# Patient Record
Sex: FEMALE | Race: BLACK OR AFRICAN AMERICAN | NOT HISPANIC OR LATINO | URBAN - METROPOLITAN AREA
[De-identification: names, ages, dates, MRNs, and addresses within clinical notes are randomized per-mention and may not be internally consistent; named-entity substitution may affect disease eponyms.]

---

## 2023-10-02 ENCOUNTER — EMERGENCY (EMERGENCY)
Facility: HOSPITAL | Age: 33
LOS: 1 days | Discharge: HOME | End: 2023-10-02
Attending: EMERGENCY MEDICINE
Payer: COMMERCIAL

## 2023-10-02 VITALS
HEART RATE: 76 BPM | RESPIRATION RATE: 18 BRPM | SYSTOLIC BLOOD PRESSURE: 114 MMHG | OXYGEN SATURATION: 99 % | DIASTOLIC BLOOD PRESSURE: 72 MMHG

## 2023-10-02 VITALS
HEIGHT: 65 IN | SYSTOLIC BLOOD PRESSURE: 107 MMHG | HEART RATE: 99 BPM | OXYGEN SATURATION: 98 % | DIASTOLIC BLOOD PRESSURE: 70 MMHG | RESPIRATION RATE: 18 BRPM | WEIGHT: 142.2 LBS | TEMPERATURE: 99 F

## 2023-10-02 LAB
ALBUMIN SERPL ELPH-MCNC: 3.5 G/DL — SIGNIFICANT CHANGE UP (ref 3.3–5.2)
ALP SERPL-CCNC: 52 U/L — SIGNIFICANT CHANGE UP (ref 40–120)
ALT FLD-CCNC: 10 U/L — SIGNIFICANT CHANGE UP
ANION GAP SERPL CALC-SCNC: 10 MMOL/L — SIGNIFICANT CHANGE UP (ref 5–17)
APPEARANCE UR: ABNORMAL
AST SERPL-CCNC: 14 U/L — SIGNIFICANT CHANGE UP
BACTERIA # UR AUTO: ABNORMAL
BASOPHILS # BLD AUTO: 0.01 K/UL — SIGNIFICANT CHANGE UP (ref 0–0.2)
BASOPHILS NFR BLD AUTO: 0.1 % — SIGNIFICANT CHANGE UP (ref 0–2)
BILIRUB SERPL-MCNC: 0.3 MG/DL — LOW (ref 0.4–2)
BILIRUB UR-MCNC: NEGATIVE — SIGNIFICANT CHANGE UP
BLD GP AB SCN SERPL QL: SIGNIFICANT CHANGE UP
BUN SERPL-MCNC: 9.1 MG/DL — SIGNIFICANT CHANGE UP (ref 8–20)
CALCIUM SERPL-MCNC: 9.5 MG/DL — SIGNIFICANT CHANGE UP (ref 8.4–10.5)
CHLORIDE SERPL-SCNC: 98 MMOL/L — SIGNIFICANT CHANGE UP (ref 96–108)
CO2 SERPL-SCNC: 25 MMOL/L — SIGNIFICANT CHANGE UP (ref 22–29)
COLOR SPEC: YELLOW — SIGNIFICANT CHANGE UP
COMMENT - URINE: SIGNIFICANT CHANGE UP
CREAT SERPL-MCNC: 0.45 MG/DL — LOW (ref 0.5–1.3)
DIFF PNL FLD: NEGATIVE — SIGNIFICANT CHANGE UP
EGFR: 130 ML/MIN/1.73M2 — SIGNIFICANT CHANGE UP
EOSINOPHIL # BLD AUTO: 0.05 K/UL — SIGNIFICANT CHANGE UP (ref 0–0.5)
EOSINOPHIL NFR BLD AUTO: 0.6 % — SIGNIFICANT CHANGE UP (ref 0–6)
EPI CELLS # UR: ABNORMAL
GLUCOSE SERPL-MCNC: 69 MG/DL — LOW (ref 70–99)
GLUCOSE UR QL: NEGATIVE MG/DL — SIGNIFICANT CHANGE UP
HCG SERPL-ACNC: HIGH MIU/ML
HCT VFR BLD CALC: 36.4 % — SIGNIFICANT CHANGE UP (ref 34.5–45)
HGB BLD-MCNC: 11.4 G/DL — LOW (ref 11.5–15.5)
IMM GRANULOCYTES NFR BLD AUTO: 0.7 % — SIGNIFICANT CHANGE UP (ref 0–0.9)
KETONES UR-MCNC: NEGATIVE — SIGNIFICANT CHANGE UP
LEUKOCYTE ESTERASE UR-ACNC: NEGATIVE — SIGNIFICANT CHANGE UP
LIDOCAIN IGE QN: 21 U/L — LOW (ref 22–51)
LYMPHOCYTES # BLD AUTO: 1.29 K/UL — SIGNIFICANT CHANGE UP (ref 1–3.3)
LYMPHOCYTES # BLD AUTO: 14.7 % — SIGNIFICANT CHANGE UP (ref 13–44)
MCHC RBC-ENTMCNC: 24.7 PG — LOW (ref 27–34)
MCHC RBC-ENTMCNC: 31.3 GM/DL — LOW (ref 32–36)
MCV RBC AUTO: 79 FL — LOW (ref 80–100)
MONOCYTES # BLD AUTO: 0.57 K/UL — SIGNIFICANT CHANGE UP (ref 0–0.9)
MONOCYTES NFR BLD AUTO: 6.5 % — SIGNIFICANT CHANGE UP (ref 2–14)
NEUTROPHILS # BLD AUTO: 6.77 K/UL — SIGNIFICANT CHANGE UP (ref 1.8–7.4)
NEUTROPHILS NFR BLD AUTO: 77.4 % — HIGH (ref 43–77)
NITRITE UR-MCNC: NEGATIVE — SIGNIFICANT CHANGE UP
PH UR: 7 — SIGNIFICANT CHANGE UP (ref 5–8)
PLATELET # BLD AUTO: 260 K/UL — SIGNIFICANT CHANGE UP (ref 150–400)
POTASSIUM SERPL-MCNC: 3.9 MMOL/L — SIGNIFICANT CHANGE UP (ref 3.5–5.3)
POTASSIUM SERPL-SCNC: 3.9 MMOL/L — SIGNIFICANT CHANGE UP (ref 3.5–5.3)
PROT SERPL-MCNC: 6.5 G/DL — LOW (ref 6.6–8.7)
PROT UR-MCNC: NEGATIVE — SIGNIFICANT CHANGE UP
RBC # BLD: 4.61 M/UL — SIGNIFICANT CHANGE UP (ref 3.8–5.2)
RBC # FLD: 14 % — SIGNIFICANT CHANGE UP (ref 10.3–14.5)
RBC CASTS # UR COMP ASSIST: SIGNIFICANT CHANGE UP /HPF (ref 0–4)
SODIUM SERPL-SCNC: 133 MMOL/L — LOW (ref 135–145)
SP GR SPEC: 1.01 — SIGNIFICANT CHANGE UP (ref 1.01–1.02)
UROBILINOGEN FLD QL: NEGATIVE MG/DL — SIGNIFICANT CHANGE UP
WBC # BLD: 8.75 K/UL — SIGNIFICANT CHANGE UP (ref 3.8–10.5)
WBC # FLD AUTO: 8.75 K/UL — SIGNIFICANT CHANGE UP (ref 3.8–10.5)
WBC UR QL: SIGNIFICANT CHANGE UP /HPF (ref 0–5)

## 2023-10-02 PROCEDURE — 87086 URINE CULTURE/COLONY COUNT: CPT

## 2023-10-02 PROCEDURE — 84702 CHORIONIC GONADOTROPIN TEST: CPT

## 2023-10-02 PROCEDURE — 96374 THER/PROPH/DIAG INJ IV PUSH: CPT

## 2023-10-02 PROCEDURE — 81001 URINALYSIS AUTO W/SCOPE: CPT

## 2023-10-02 PROCEDURE — 76805 OB US >/= 14 WKS SNGL FETUS: CPT | Mod: 26

## 2023-10-02 PROCEDURE — 76705 ECHO EXAM OF ABDOMEN: CPT

## 2023-10-02 PROCEDURE — 80053 COMPREHEN METABOLIC PANEL: CPT

## 2023-10-02 PROCEDURE — 83690 ASSAY OF LIPASE: CPT

## 2023-10-02 PROCEDURE — 99284 EMERGENCY DEPT VISIT MOD MDM: CPT | Mod: 25

## 2023-10-02 PROCEDURE — 86901 BLOOD TYPING SEROLOGIC RH(D): CPT

## 2023-10-02 PROCEDURE — 76705 ECHO EXAM OF ABDOMEN: CPT | Mod: 26

## 2023-10-02 PROCEDURE — 85025 COMPLETE CBC W/AUTO DIFF WBC: CPT

## 2023-10-02 PROCEDURE — 74181 MRI ABDOMEN W/O CONTRAST: CPT | Mod: 26,MA

## 2023-10-02 PROCEDURE — 74181 MRI ABDOMEN W/O CONTRAST: CPT | Mod: MA

## 2023-10-02 PROCEDURE — 99284 EMERGENCY DEPT VISIT MOD MDM: CPT

## 2023-10-02 PROCEDURE — 36415 COLL VENOUS BLD VENIPUNCTURE: CPT

## 2023-10-02 PROCEDURE — 76805 OB US >/= 14 WKS SNGL FETUS: CPT

## 2023-10-02 PROCEDURE — 86900 BLOOD TYPING SEROLOGIC ABO: CPT

## 2023-10-02 PROCEDURE — 86850 RBC ANTIBODY SCREEN: CPT

## 2023-10-02 RX ORDER — ACETAMINOPHEN 500 MG
1000 TABLET ORAL ONCE
Refills: 0 | Status: COMPLETED | OUTPATIENT
Start: 2023-10-02 | End: 2023-10-02

## 2023-10-02 RX ORDER — CEPHALEXIN 500 MG
500 CAPSULE ORAL ONCE
Refills: 0 | Status: COMPLETED | OUTPATIENT
Start: 2023-10-02 | End: 2023-10-02

## 2023-10-02 RX ORDER — SODIUM CHLORIDE 9 MG/ML
1000 INJECTION INTRAMUSCULAR; INTRAVENOUS; SUBCUTANEOUS ONCE
Refills: 0 | Status: DISCONTINUED | OUTPATIENT
Start: 2023-10-02 | End: 2023-10-09

## 2023-10-02 RX ORDER — SODIUM CHLORIDE 9 MG/ML
1000 INJECTION INTRAMUSCULAR; INTRAVENOUS; SUBCUTANEOUS ONCE
Refills: 0 | Status: COMPLETED | OUTPATIENT
Start: 2023-10-02 | End: 2023-10-02

## 2023-10-02 RX ADMIN — SODIUM CHLORIDE 1000 MILLILITER(S): 9 INJECTION INTRAMUSCULAR; INTRAVENOUS; SUBCUTANEOUS at 10:31

## 2023-10-02 RX ADMIN — Medication 400 MILLIGRAM(S): at 10:31

## 2023-10-02 RX ADMIN — Medication 500 MILLIGRAM(S): at 18:07

## 2023-10-02 NOTE — ED STATDOCS - NSFOLLOWUPINSTRUCTIONS_ED_ALL_ED_FT
-Tanpri fè swivi ak doktè prensipal ou nan 2 brandie glory iker yo. Tanpri rele demen betsy yon randevou. Si ou pa kapab swiv doktè ou, tanpri retounen nan ED betsy nenpòt pwoblèm ijan. - Yo te ba w yon kopi tès yo fè delilah a. Tanpri pote rezilta yo avèk ou epi revize yo avèk doktè swen prensipal ou. - Si w gen nenpòt ki sentòm yo anthony pi grav oswa nenpòt lòt enkyetid, tanpri retounen nan ED la imedyatman. - Tanpri kontinye pran medikaman lakay ou madiha yo mande w la.    Doulè nan Vant, Adilt  Abdominal Pain, Adult  Anpil bagay ka lakòz doulè nan nicole (doulè abdominal). Souvan, doulè nan vant abel pa grav e li amelyore angeles okenn tretman oswa ak tretman adomisil. Men, pafwa doulè nan vant abel ka grav.    Pwofesyonèl swen sante w pral poze w kesyon ana istwa medikal ou epi l ap fè yon egzamen fizik betsy idania dekouvri kòz doulè nan vant ou an.    Swiv direktiv sa yo lakay ou:    Medikaman yo    Pran medikaman [place] preskripsyon ak medikaman ana preskripsyon sèlman madiha pwofesyonèl swen sante w di ou.  Piga pran yon laksatif amwens ke pwofesyonèl swen sante w nan di w sa.  Direktiv jeneral    Siveye pwoblèm ou an betsy nenpòt chanjman.  Bwè ase likid betsy kenbe pipi w jòn pal.  Naina nan tout vizit suivi yo madiha pwofesyonèl swen sante w di ou. Sa enpòtan.  Kontakte yon pwofesyonèl swen sante si:  Doulè nan vant ou an chanje oswa li anthony pi mal.  Ou pa grangou oubyen w ap pèdi pwa angeles w pa eseye.  Ou konstipe oswa ou gen dyare pandan plis pase 2–3 brandie.  Ou gen doulè lè w ap pipi oswa lè w ap poupou.  Doulè nan vant ou an reveye w lannwit.  Doulè ou anthony pi grav avèk repa, apre w fin manje oswa avèk sèten manje.  W ap vomi epi ou pa kapab kenbe anyen nan vant ou.  Ou gen yon lafyèv.  Ou gen [place] nan pipi ou.  Chèche èd touswit si:  Doulè w la pa naina nan lespas tan pwofesyonèl swen sante w te di w betsy w prevwa.  Ou pa kapab sispann vomi.  Ou maryana doulè ou sèlman nan kèk zòn vant ou, tankou nan bò dwat la oswa nan marco antonio enferyè goch vant ou. Doulè ana bò dwat la kapab akòz apenndisit.  Ou gen poupou sanglan oswa nwa, oswa poupou ki tankou goudwon.  Ou gen doulè grav, kranp oswa [facility] janay vant ou.  Ou gen siy dezidratasyon, tankou:  Pipi fonse, yon kantite pipi ki piti anpil, oswa ou pa pipi.  Po bouch colleen.  Bouch sèk.  Je giulia.  Anvi dòmi.  Feblès.  Ou gen pwoblèm betsy respire oswa doulè nan pwatrin.  Rezime  Souvan, doulè nan vant abel pa grav e li amelyore [facility] oswa ak trean adomisil. Men, pafwa doulè nan vant abel ka grav.  Siveye pwoblèm ou an betsy nenpòt chanjman.  Pran medikaman angeles preskripsyon ak medikaman ana preskripsyon sèlman madiha pwofesyonèl swen sante w di ou.  Kontakte yon pwofesyonèl swen sante si doulè nan vant ou an chanje oswa li anthony pi mal.  Chèche èd touswit si ou gen doulè grav, kranp oswa balònman nan vant ou.  Enfòmasyon sa yo pa la betsy ranplase konsèy pwofesyonèl swen sante ou ba ou. Sonje betsy pale ana nenpòt kesyon ou genyen avèk pwofesyonèl swen sante ou.

## 2023-10-02 NOTE — ED STATDOCS - PROGRESS NOTE DETAILS
OB US with single live IUP. Appendix on US could not be visualized. Patient was reassessed, continues to complain of RLQ pain and is tender on exam, MRI abdomen/pelvis ordered and MRI tech informed. GYN was also consulted. Asymptomatic bacteruria on UA, Keflex given. - Pinkhasov recvd in sigbn out Dr Riana Rock to me  Pregnant r/o appy awaiting MR MR does not visualize appendix however on PE by me pt is not tender to palpation , has no guarding or rebound and has eaten without any incident or pain   Pt understands to return immediately if pain reoccurs

## 2023-10-02 NOTE — ED ADULT NURSE NOTE - NSFALLUNIVINTERV_ED_ALL_ED
Bed/Stretcher in lowest position, wheels locked, appropriate side rails in place/Call bell, personal items and telephone in reach/Instruct patient to call for assistance before getting out of bed/chair/stretcher/Non-slip footwear applied when patient is off stretcher/[place] to call system/Physically safe environment - no spills, clutter or unnecessary equipment/Purposeful proactive rounding/Room/bathroom lighting operational, light cord in reach

## 2023-10-02 NOTE — ED STATDOCS - GASTROINTESTINAL, MLM
abdomen soft, RLQ tenderness to palpation, suprapubic tenderness, and non-distended. Bowel sounds present.

## 2023-10-02 NOTE — ED ADULT NURSE REASSESSMENT NOTE - NS ED NURSE REASSESS COMMENT FT1
Pt A & Ox4 w/ visitors at bedside. Offers no complaints at this time. Provider at bedside, plan of care is awaiting d/c at this time.

## 2023-10-02 NOTE — ED STATDOCS - CLINICAL SUMMARY MEDICAL DECISION MAKING FREE TEXT BOX
32 y/o female appox 12 weeks p/w RLQ abdominal pain 8/10. No vaginal bleeding. Doesn't not follow with GYN or had ultrasound done yet. DDx includes round ligament pain vs appendicitis vs UTI among other etiologies. Will check labs, appendix sono, GYN sono, start on Tylenol, IV fluids, f/u studies and dispo pending clinical course.

## 2023-10-02 NOTE — ED ADULT NURSE NOTE - OBJECTIVE STATEMENT
c/o HA x 3 days, right sided abd pain and diarrhea x 1 day. Pt 12 weeks pregnant , PMH of 2 c sections. Pt denies dizziness, SOB, N/V, fevers, chills, CP, palpitations. Pt AOx4, speaking coherently, respirations even and unlabored on RA, skin warm and dry.

## 2023-10-02 NOTE — ED ADULT NURSE REASSESSMENT NOTE - NS ED NURSE REASSESS COMMENT FT1
Transport present to bring pt to MRI. Pt stated she has nothing metal on her or her clothing. Pt resting comfortably, respirations even and unlabored on RA, skin warm and dry.

## 2023-10-02 NOTE — ED STATDOCS - OBJECTIVE STATEMENT
32 y/o female  @12 weeks with pmhx of  2x, c/o headache x3 days, right sided abdominal pain and diarrhea x1 day. Denies taking any medication. Denies vaginal bleeding. No fever. Denies prior GYN evaluations or ultrasound for pregnancy. No nausea or vomiting. Pt reports that in 1st pregnancy was vomiting blood with other complications which lead to  and  2nd she opted for .    # [id number]

## 2023-10-02 NOTE — ED STATDOCS - CARE PROVIDER_API CALL
Ramone Padron  Obstetrics and Gynecology  [address], [address]  [place] [address]  Phone: [phone]  Fax: [phone]  Follow Up Time:

## 2023-10-02 NOTE — ED STATDOCS - PATIENT PORTAL LINK FT
You can access the FollowMyHealth Patient Portal offered by [facility] by registering at the following website: [email or web address]. By joining [employer]’s FollowMyHealth portal, you will also be able to view your health information using other applications (apps) compatible with our system.

## 2023-10-02 NOTE — ED ADULT TRIAGE NOTE - CHIEF COMPLAINT QUOTE
pt states she is 3 months pregnant, c/o right lower abd pain, diarrhea yesterday  A&Ox3, resp wnl, NAD

## 2023-10-04 LAB
CULTURE RESULTS: SIGNIFICANT CHANGE UP
SPECIMEN SOURCE: SIGNIFICANT CHANGE UP

## 2023-11-08 ENCOUNTER — APPOINTMENT (OUTPATIENT)
Dept: ANTEPARTUM | Facility: CLINIC | Age: 33
End: 2023-11-08
Payer: MEDICAID

## 2023-11-08 ENCOUNTER — ASOB RESULT (OUTPATIENT)
Age: 33
End: 2023-11-08

## 2023-11-08 PROCEDURE — 76811 OB US DETAILED SNGL FETUS: CPT

## 2023-11-08 PROCEDURE — 76817 TRANSVAGINAL US OBSTETRIC: CPT | Mod: 59

## 2023-12-12 PROBLEM — Z00.00 ENCOUNTER FOR PREVENTIVE HEALTH EXAMINATION: Status: ACTIVE | Noted: 2023-12-12

## 2023-12-13 ENCOUNTER — APPOINTMENT (OUTPATIENT)
Dept: MATERNAL FETAL MEDICINE | Facility: CLINIC | Age: 33
End: 2023-12-13
Payer: MEDICAID

## 2023-12-13 ENCOUNTER — ASOB RESULT (OUTPATIENT)
Age: 33
End: 2023-12-13

## 2023-12-13 PROCEDURE — 99442: CPT

## 2024-02-28 ENCOUNTER — APPOINTMENT (OUTPATIENT)
Dept: ANTEPARTUM | Facility: CLINIC | Age: 34
End: 2024-02-28
Payer: COMMERCIAL

## 2024-02-28 ENCOUNTER — ASOB RESULT (OUTPATIENT)
Age: 34
End: 2024-02-28

## 2024-02-28 PROCEDURE — 76819 FETAL BIOPHYS PROFIL W/O NST: CPT

## 2024-02-28 PROCEDURE — 76816 OB US FOLLOW-UP PER FETUS: CPT

## 2024-03-15 ENCOUNTER — OUTPATIENT (OUTPATIENT)
Dept: OUTPATIENT SERVICES | Facility: HOSPITAL | Age: 34
LOS: 1 days | End: 2024-03-15
Payer: COMMERCIAL

## 2024-03-15 DIAGNOSIS — Z01.812 ENCOUNTER FOR PREPROCEDURAL LABORATORY EXAMINATION: ICD-10-CM

## 2024-03-15 PROCEDURE — 86900 BLOOD TYPING SEROLOGIC ABO: CPT

## 2024-03-15 PROCEDURE — 36415 COLL VENOUS BLD VENIPUNCTURE: CPT

## 2024-03-15 PROCEDURE — 86901 BLOOD TYPING SEROLOGIC RH(D): CPT

## 2024-03-15 PROCEDURE — 86850 RBC ANTIBODY SCREEN: CPT

## 2024-03-17 ENCOUNTER — TRANSCRIPTION ENCOUNTER (OUTPATIENT)
Age: 34
End: 2024-03-17

## 2024-03-17 RX ORDER — OXYTOCIN 10 UNIT/ML
333.33 VIAL (ML) INJECTION
Qty: 20 | Refills: 0 | Status: DISCONTINUED | OUTPATIENT
Start: 2024-03-18 | End: 2024-03-20

## 2024-03-17 RX ORDER — CHLORHEXIDINE GLUCONATE 213 G/1000ML
1 SOLUTION TOPICAL DAILY
Refills: 0 | Status: DISCONTINUED | OUTPATIENT
Start: 2024-03-18 | End: 2024-03-18

## 2024-03-17 RX ORDER — SODIUM CHLORIDE 9 MG/ML
1000 INJECTION, SOLUTION INTRAVENOUS
Refills: 0 | Status: DISCONTINUED | OUTPATIENT
Start: 2024-03-18 | End: 2024-03-18

## 2024-03-18 ENCOUNTER — INPATIENT (INPATIENT)
Facility: HOSPITAL | Age: 34
LOS: 1 days | Discharge: HOME | End: 2024-03-20
Attending: OBSTETRICS & GYNECOLOGY | Admitting: OBSTETRICS & GYNECOLOGY
Payer: COMMERCIAL

## 2024-03-18 ENCOUNTER — TRANSCRIPTION ENCOUNTER (OUTPATIENT)
Age: 34
End: 2024-03-18

## 2024-03-18 VITALS
TEMPERATURE: 98 F | HEIGHT: 63 IN | HEART RATE: 101 BPM | SYSTOLIC BLOOD PRESSURE: 112 MMHG | RESPIRATION RATE: 18 BRPM | DIASTOLIC BLOOD PRESSURE: 67 MMHG | WEIGHT: 164.02 LBS

## 2024-03-18 DIAGNOSIS — O34.211 MATERNAL CARE FOR LOW TRANSVERSE SCAR FROM PREVIOUS CESAREAN DELIVERY: ICD-10-CM

## 2024-03-18 DIAGNOSIS — Z98.891 HISTORY OF UTERINE SCAR FROM PREVIOUS SURGERY: Chronic | ICD-10-CM

## 2024-03-18 LAB
BASOPHILS # BLD AUTO: 0.02 K/UL — SIGNIFICANT CHANGE UP (ref 0–0.2)
BASOPHILS NFR BLD AUTO: 0.2 % — SIGNIFICANT CHANGE UP (ref 0–2)
BLD GP AB SCN SERPL QL: SIGNIFICANT CHANGE UP
COMMENT - BLOOD BANK: SIGNIFICANT CHANGE UP
EOSINOPHIL # BLD AUTO: 0.04 K/UL — SIGNIFICANT CHANGE UP (ref 0–0.5)
EOSINOPHIL NFR BLD AUTO: 0.5 % — SIGNIFICANT CHANGE UP (ref 0–6)
HCT VFR BLD CALC: 35.5 % — SIGNIFICANT CHANGE UP (ref 34.5–45)
HCT VFR BLD CALC: 38.3 % — SIGNIFICANT CHANGE UP (ref 34.5–45)
HGB BLD-MCNC: 11.9 G/DL — SIGNIFICANT CHANGE UP (ref 11.5–15.5)
HGB BLD-MCNC: 12.7 G/DL — SIGNIFICANT CHANGE UP (ref 11.5–15.5)
IMM GRANULOCYTES NFR BLD AUTO: 1.1 % — HIGH (ref 0–0.9)
LYMPHOCYTES # BLD AUTO: 1.55 K/UL — SIGNIFICANT CHANGE UP (ref 1–3.3)
LYMPHOCYTES # BLD AUTO: 18.8 % — SIGNIFICANT CHANGE UP (ref 13–44)
MCHC RBC-ENTMCNC: 26.8 PG — LOW (ref 27–34)
MCHC RBC-ENTMCNC: 27.3 PG — SIGNIFICANT CHANGE UP (ref 27–34)
MCHC RBC-ENTMCNC: 33.2 GM/DL — SIGNIFICANT CHANGE UP (ref 32–36)
MCHC RBC-ENTMCNC: 33.5 GM/DL — SIGNIFICANT CHANGE UP (ref 32–36)
MCV RBC AUTO: 81 FL — SIGNIFICANT CHANGE UP (ref 80–100)
MCV RBC AUTO: 81.4 FL — SIGNIFICANT CHANGE UP (ref 80–100)
MONOCYTES # BLD AUTO: 0.84 K/UL — SIGNIFICANT CHANGE UP (ref 0–0.9)
MONOCYTES NFR BLD AUTO: 10.2 % — SIGNIFICANT CHANGE UP (ref 2–14)
NEUTROPHILS # BLD AUTO: 5.7 K/UL — SIGNIFICANT CHANGE UP (ref 1.8–7.4)
NEUTROPHILS NFR BLD AUTO: 69.2 % — SIGNIFICANT CHANGE UP (ref 43–77)
PLATELET # BLD AUTO: 187 K/UL — SIGNIFICANT CHANGE UP (ref 150–400)
PLATELET # BLD AUTO: 212 K/UL — SIGNIFICANT CHANGE UP (ref 150–400)
RBC # BLD: 4.36 M/UL — SIGNIFICANT CHANGE UP (ref 3.8–5.2)
RBC # BLD: 4.73 M/UL — SIGNIFICANT CHANGE UP (ref 3.8–5.2)
RBC # FLD: 14.3 % — SIGNIFICANT CHANGE UP (ref 10.3–14.5)
RBC # FLD: 14.4 % — SIGNIFICANT CHANGE UP (ref 10.3–14.5)
WBC # BLD: 12.49 K/UL — HIGH (ref 3.8–10.5)
WBC # BLD: 8.24 K/UL — SIGNIFICANT CHANGE UP (ref 3.8–10.5)
WBC # FLD AUTO: 12.49 K/UL — HIGH (ref 3.8–10.5)
WBC # FLD AUTO: 8.24 K/UL — SIGNIFICANT CHANGE UP (ref 3.8–10.5)

## 2024-03-18 PROCEDURE — 88302 TISSUE EXAM BY PATHOLOGIST: CPT | Mod: 26

## 2024-03-18 PROCEDURE — 59514 CESAREAN DELIVERY ONLY: CPT | Mod: U9,GC

## 2024-03-18 PROCEDURE — 58700 REMOVAL OF FALLOPIAN TUBE: CPT | Mod: GC

## 2024-03-18 DEVICE — SURGICEL 2 X 14": Type: IMPLANTABLE DEVICE | Status: FUNCTIONAL

## 2024-03-18 DEVICE — SURGICEL SNOW 4 X 4": Type: IMPLANTABLE DEVICE | Status: FUNCTIONAL

## 2024-03-18 RX ORDER — SODIUM CHLORIDE 9 MG/ML
1000 INJECTION, SOLUTION INTRAVENOUS ONCE
Refills: 0 | Status: COMPLETED | OUTPATIENT
Start: 2024-03-18 | End: 2024-03-18

## 2024-03-18 RX ORDER — KETOROLAC TROMETHAMINE 30 MG/ML
30 SYRINGE (ML) INJECTION EVERY 6 HOURS
Refills: 0 | Status: DISCONTINUED | OUTPATIENT
Start: 2024-03-18 | End: 2024-03-19

## 2024-03-18 RX ORDER — LANOLIN
1 OINTMENT (GRAM) TOPICAL EVERY 6 HOURS
Refills: 0 | Status: DISCONTINUED | OUTPATIENT
Start: 2024-03-18 | End: 2024-03-20

## 2024-03-18 RX ORDER — SIMETHICONE 80 MG/1
80 TABLET, CHEWABLE ORAL EVERY 4 HOURS
Refills: 0 | Status: DISCONTINUED | OUTPATIENT
Start: 2024-03-18 | End: 2024-03-20

## 2024-03-18 RX ORDER — OXYCODONE HYDROCHLORIDE 5 MG/1
5 TABLET ORAL
Refills: 0 | Status: DISCONTINUED | OUTPATIENT
Start: 2024-03-18 | End: 2024-03-20

## 2024-03-18 RX ORDER — SODIUM CHLORIDE 9 MG/ML
1000 INJECTION, SOLUTION INTRAVENOUS
Refills: 0 | Status: DISCONTINUED | OUTPATIENT
Start: 2024-03-18 | End: 2024-03-20

## 2024-03-18 RX ORDER — TRANEXAMIC ACID 100 MG/ML
1000 INJECTION, SOLUTION INTRAVENOUS ONCE
Refills: 0 | Status: COMPLETED | OUTPATIENT
Start: 2024-03-18 | End: 2024-03-18

## 2024-03-18 RX ORDER — OXYCODONE HYDROCHLORIDE 5 MG/1
5 TABLET ORAL ONCE
Refills: 0 | Status: DISCONTINUED | OUTPATIENT
Start: 2024-03-18 | End: 2024-03-20

## 2024-03-18 RX ORDER — IBUPROFEN 200 MG
600 TABLET ORAL EVERY 6 HOURS
Refills: 0 | Status: COMPLETED | OUTPATIENT
Start: 2024-03-18 | End: 2025-02-14

## 2024-03-18 RX ORDER — ONDANSETRON 8 MG/1
4 TABLET, FILM COATED ORAL ONCE
Refills: 0 | Status: COMPLETED | OUTPATIENT
Start: 2024-03-18 | End: 2024-03-18

## 2024-03-18 RX ORDER — DIPHENHYDRAMINE HCL 50 MG
25 CAPSULE ORAL EVERY 6 HOURS
Refills: 0 | Status: DISCONTINUED | OUTPATIENT
Start: 2024-03-18 | End: 2024-03-20

## 2024-03-18 RX ORDER — FAMOTIDINE 10 MG/ML
20 INJECTION INTRAVENOUS ONCE
Refills: 0 | Status: COMPLETED | OUTPATIENT
Start: 2024-03-18 | End: 2024-03-18

## 2024-03-18 RX ORDER — ACETAMINOPHEN 500 MG
975 TABLET ORAL
Refills: 0 | Status: DISCONTINUED | OUTPATIENT
Start: 2024-03-18 | End: 2024-03-20

## 2024-03-18 RX ORDER — ACETAMINOPHEN 500 MG
975 TABLET ORAL ONCE
Refills: 0 | Status: COMPLETED | OUTPATIENT
Start: 2024-03-18 | End: 2024-03-18

## 2024-03-18 RX ORDER — SCOPALAMINE 1 MG/3D
1 PATCH, EXTENDED RELEASE TRANSDERMAL ONCE
Refills: 0 | Status: COMPLETED | OUTPATIENT
Start: 2024-03-18 | End: 2024-03-18

## 2024-03-18 RX ORDER — CITRIC ACID/SODIUM CITRATE 300-500 MG
30 SOLUTION, ORAL ORAL ONCE
Refills: 0 | Status: COMPLETED | OUTPATIENT
Start: 2024-03-18 | End: 2024-03-18

## 2024-03-18 RX ORDER — MAGNESIUM HYDROXIDE 400 MG/1
30 TABLET, CHEWABLE ORAL
Refills: 0 | Status: DISCONTINUED | OUTPATIENT
Start: 2024-03-18 | End: 2024-03-20

## 2024-03-18 RX ORDER — OXYTOCIN 10 UNIT/ML
333.33 VIAL (ML) INJECTION
Qty: 20 | Refills: 0 | Status: DISCONTINUED | OUTPATIENT
Start: 2024-03-18 | End: 2024-03-20

## 2024-03-18 RX ORDER — TETANUS TOXOID, REDUCED DIPHTHERIA TOXOID AND ACELLULAR PERTUSSIS VACCINE, ADSORBED 5; 2.5; 8; 8; 2.5 [IU]/.5ML; [IU]/.5ML; UG/.5ML; UG/.5ML; UG/.5ML
0.5 SUSPENSION INTRAMUSCULAR ONCE
Refills: 0 | Status: DISCONTINUED | OUTPATIENT
Start: 2024-03-18 | End: 2024-03-20

## 2024-03-18 RX ORDER — ENOXAPARIN SODIUM 100 MG/ML
40 INJECTION SUBCUTANEOUS EVERY 24 HOURS
Refills: 0 | Status: DISCONTINUED | OUTPATIENT
Start: 2024-03-19 | End: 2024-03-20

## 2024-03-18 RX ORDER — CEFAZOLIN SODIUM 1 G
2000 VIAL (EA) INJECTION ONCE
Refills: 0 | Status: DISCONTINUED | OUTPATIENT
Start: 2024-03-18 | End: 2024-03-18

## 2024-03-18 RX ORDER — CEFAZOLIN SODIUM 1 G
2000 VIAL (EA) INJECTION ONCE
Refills: 0 | Status: COMPLETED | OUTPATIENT
Start: 2024-03-18 | End: 2024-03-18

## 2024-03-18 RX ADMIN — SCOPALAMINE 1 PATCH: 1 PATCH, EXTENDED RELEASE TRANSDERMAL at 12:23

## 2024-03-18 RX ADMIN — ONDANSETRON 4 MILLIGRAM(S): 8 TABLET, FILM COATED ORAL at 17:35

## 2024-03-18 RX ADMIN — Medication 30 MILLILITER(S): at 12:21

## 2024-03-18 RX ADMIN — SODIUM CHLORIDE 125 MILLILITER(S): 9 INJECTION, SOLUTION INTRAVENOUS at 17:36

## 2024-03-18 RX ADMIN — Medication 975 MILLIGRAM(S): at 12:51

## 2024-03-18 RX ADMIN — TRANEXAMIC ACID 220 MILLIGRAM(S): 100 INJECTION, SOLUTION INTRAVENOUS at 12:55

## 2024-03-18 RX ADMIN — Medication 975 MILLIGRAM(S): at 12:21

## 2024-03-18 RX ADMIN — SODIUM CHLORIDE 2000 MILLILITER(S): 9 INJECTION, SOLUTION INTRAVENOUS at 12:00

## 2024-03-18 RX ADMIN — Medication 975 MILLIGRAM(S): at 21:03

## 2024-03-18 RX ADMIN — Medication 1000 MILLIUNIT(S)/MIN: at 13:47

## 2024-03-18 RX ADMIN — Medication 30 MILLIGRAM(S): at 17:35

## 2024-03-18 RX ADMIN — SODIUM CHLORIDE 125 MILLILITER(S): 9 INJECTION, SOLUTION INTRAVENOUS at 12:24

## 2024-03-18 RX ADMIN — Medication 2000 MILLIGRAM(S): at 12:54

## 2024-03-18 RX ADMIN — FAMOTIDINE 20 MILLIGRAM(S): 10 INJECTION INTRAVENOUS at 12:21

## 2024-03-18 RX ADMIN — CHLORHEXIDINE GLUCONATE 1 APPLICATION(S): 213 SOLUTION TOPICAL at 12:43

## 2024-03-18 NOTE — OB PROVIDER H&P - HISTORY OF PRESENT ILLNESS
Patient is a 33 year old  at 39 wks GA by first trimester sono who presents to L&D for rCS + BS.     JO-ANN:  3/25/24   LMP: 23     Pregnancy course:   Fetal EIF on sono, Reshma LR   Prior CSx2     Obhx: : 18 FT pCS for malpresentation, Female, [place]   G2: 10/11/21 FT rCS, Male, [place]   Gynhx: denies fibroids, cysts, STIs, abnl paps   Pmhx: denies   Pshx: CSx2   Meds: PNV   Allergies: NKDA     Social Hx: denies tobacco, recreational drug, alcohol use during pregnancy. Feels safe at home   Ultrasound: vtx, anterior   EFW: 2581g, 21%ile ()   PPBC: BS   BMI: 25.8

## 2024-03-18 NOTE — DISCHARGE NOTE OB - NS MD DC FALL RISK RISK
For information on Fall & Injury Prevention, visit: [email or web address] OR  [email or web address] OR  https://www.cdc.gov/steadi/patient.html

## 2024-03-18 NOTE — DISCHARGE NOTE OB - CARE PROVIDER_API CALL
Eliana Beal  Obstetrics and Gynecology  [facility] [place] [address]  Phone: [phone]  Fax: [phone]  Follow Up Time: 2 weeks

## 2024-03-18 NOTE — OB PROVIDER DELIVERY SUMMARY - NSLOWPPHRISK_OBGYN_A_OB
Ramirez Pregnancy/Less than or equal to 4 previous vaginal births/No known bleeding disorder/No history of postpartum hemorrhage/No other PPH risks indicated

## 2024-03-18 NOTE — OB PROVIDER H&P - ASSESSMENT
Patient is a 33 y.o.  at 39w GA admitted to L&D for scheduled repeat  section + BTL. Cat I FHT.   - consents  - admission labs  - Diet: NPO  - IV hydration  - continuous toco and fetal heart monitoring until surgery  - preop antibiotics 2g ancef  - GBS neg  -PPBC: BTL    Discussed with Dr. Weems

## 2024-03-18 NOTE — DISCHARGE NOTE OB - HOSPITAL COURSE
Patient underwent a repeat  and tubal. Post-partum course was uncomplicated. Pain is well controlled with PRN medication. She has no difficulty with ambulation, voiding, or PO intake. Lab values and vital signs are within normal limits prior to discharge.

## 2024-03-18 NOTE — OB RN DELIVERY SUMMARY - NSMECDELIVBABYA_OBGYN_ALL_OB
PT BIB MOM for possible left ear infection started this morning. mom stated pt had some clear discharge this morning, pt tugging on his ear, being fussy. eating ok making enough wet diapers. Tylenol 10:00AM.
no

## 2024-03-18 NOTE — OB RN INTRAOPERATIVE NOTE - NSSELHIDDEN_OBGYN_ALL_OB_FT
[NS_DeliveryAssist1_OBGYN_ALL_OB_FT:[id number]],[NS_DeliveryRN_OBGYN_ALL_OB_FT:[id number]==],[NS_DeliveryAttending2_OBGYN_ALL_OB_FT:[id number]==]

## 2024-03-18 NOTE — OB RN DELIVERY SUMMARY - NS_SEPSISRSKCALC_OBGYN_ALL_OB_FT
EOS calculated successfully. EOS Risk Factor: 0.5/1000 live births ([facility] national incidence); GA=39w;Temp=98.1; ROM=0.017; GBS='Negative'; Antibiotics='No antibiotics or any antibiotics < 2 hrs prior to birth'

## 2024-03-18 NOTE — OB RN DELIVERY SUMMARY - NSSELHIDDEN_OBGYN_ALL_OB_FT
[NS_DeliveryAssist1_OBGYN_ALL_OB_FT:[id number]],[NS_DeliveryRN_OBGYN_ALL_OB_FT:[id number]],[NS_DeliveryAttending2_OBGYN_ALL_OB_FT:[id number]] [NS_DeliveryAssist1_OBGYN_ALL_OB_FT:[id number]],[NS_DeliveryRN_OBGYN_ALL_OB_FT:[id number]],[NS_DeliveryAttending2_OBGYN_ALL_OB_FT:[id number]],[NS_DeliveryAttending1_OBGYN_ALL_OB_FT:[id number]==]

## 2024-03-18 NOTE — OB RN PATIENT PROFILE - COMFORT/ACCEPTABLE PAIN LEVEL (0-10)
Pt returned call. Gave results, scheduled pt for 6/3 @ 11:00 am phone visit. Pt does NOT use MyChart, so will have to be phone call only, to discuss options about fracture risk treatments with Dr. Mago Sanderson. Pt aware that she will get another phone call when we receive her blood lab results from [facility].      KS and MA's CARLOSI 3

## 2024-03-18 NOTE — OB RN PATIENT PROFILE - TOBACCO USE
"Assessment & Plan:       ICD-10-CM    1. Attention deficit hyperactivity disorder (ADHD), combined type  F90.2    2. Neck pain, chronic  M54.2     G89.29    3. Migraine without status migrainosus, not intractable, unspecified migraine type  G43.909    4. Anxiety  F41.1         We reviewed the treatment options for her chronic neck pain, migraine headache, and ADHD symptoms and we will continue the hydrocodone and adderall as she has been. We discussed the potential for abuse or harm from misuse for both medications, and we had her complete a new treatment agreement today. We reviewed activity as tolerated and use of heat and/or ice tid-qid for comfort for her neck pain. She will call or return to clinic with any ongoing or worsening symptoms. She will continue wellbutrin  mg two times a day for smoking cessation and anxiety. She will call with any significant side effects and will follow up as needed. She will otherwise will f/u in 2-3 mos for a routine med check.             Subjective:            Bindu Hollins is a 39 year old female who presents for follow up of inattention and poor concentration. She has a several year history of inattention with additional symptoms that include need for frequent task redirection, fidgets with hands or feet or squirms in seat, displays difficulty remaining seated and acts as if \"driven by a motor\". She also reports: has difficulty organizing tasks and activities, is easily distracted by extraneous stimuli, is often forgetful in daily activities and avoids engaging in tasks that require sustained attention. She is reported to have a pattern of academic underachievement and school difficulties. She denies has difficulty awaiting turn and interrupts or intrudes on others and talks excessively.       She reports inattention, hyperactivity, which have been worse following a concussion in July. Her memory is slowly improving following that. She continues to have difficulty " getting words out. She has more issues in the afternoon.        She reports that her mood is ok. She denies significant anxiety recently. She restarted wellbutrin for smoking cessation.        Current treatment: adderall 20 mg bid. She complains of the following side effects from the treatment: decreased appetite.         She also has chronic neck pain and migraine headaches. Event that precipitated these symptoms: none known. Onset of symptoms was several years ago, and have been gradually worsening since that time. She had a concussion following an injury in July at school. She hit the ground and bounced after hitting her partner's head. She has been slowly improving. Patient denies numbness or weakness in arms. Patient has had recurrent self limited episodes of neck pain in the past and previous osteoarthritis of cervical spine. Previous treatments: physical therapy, chiropractor/manipulation and medication: hydrocodone.        She has been taking hydrocodone 10/325 mg 3-4x daily, occasionally more recently due to her injury and concussion. Has tried several migraine prevention medications including topamax, gabapentin and multiple triptan medications in the past without relief.         She has also had some jaw pain recently and her chiropractor recommended a TMJ specialist. She also had her veins evaluated and is considering treatment.         She is interested in wellbutrin for smoking cessation. Has done well with it previously.        She has a new job at [employer] as a     The following portions of the patient's history were reviewed and updated as appropriate: allergies, current medications, past family history, past medical history, past social history, past surgical history and problem list.    Review of Systems  12 point ROS negative except as noted above      Objective:     Vitals:    09/23/21 1710   BP: 122/80   BP Location: Right arm   Patient Position: Sitting   Cuff  "Size: Adult Regular   Pulse: 77   SpO2: 99%   Weight: 84.8 kg (186 lb 14.4 oz)   Height: 1.664 m (5' 5.5\")     GEN: Alert and oriented, NAD, well nourished  SKIN:  Normal skin turgor, no lesions/rashes   HEENT: NC/AT, moist mucous membranes, no rhinorrhea.    NECK: Normal.  No adenopathy or thyromegaly.  CV: Regular rate and rhythm, no murmurs.   LUNGS: Clear to auscultation bilaterally.    ABDOMEN: Soft, non-tender, non-distended, no masses   BACK: Normal  EXTREMITY: No edema, cyanosis  NEURO: Grossly normal.               " Never smoker

## 2024-03-18 NOTE — DISCHARGE NOTE OB - PATIENT PORTAL LINK FT
You can access the FollowMyHealth Patient Portal offered by [facility] by registering at the following website: [email or web address]. By joining [employer]’s FollowMyHealth portal, you will also be able to view your health information using other applications (apps) compatible with our system.

## 2024-03-18 NOTE — OB PROVIDER H&P - NSHPPHYSICALEXAM_GEN_ALL_CORE
Vital Signs  T(C): 36.7 (18 Mar 2024 11:54), Max: 36.7 (18 Mar 2024 10:58)  T(F): 98.06 (18 Mar 2024 11:54), Max: 98.1 (18 Mar 2024 10:58)  HR: 101 (18 Mar 2024 11:50) (101 - 101)  BP: 112/67 (18 Mar 2024 11:50) (112/67 - 112/67)  RR: 18 (18 Mar 2024 11:54) (18 - 18)    GEN: AAOx3  ABD: gravid, soft, nontender  EXTREM: nontender, no erythema, no pitting edema    FHT: 150bpm, +accels, no decels mod variability   [place]: irreg contractions q4-10 mins

## 2024-03-18 NOTE — OB PROVIDER H&P - NSLOWPPHRISK_OBGYN_A_OB
[facility] Quality Flow/Interdisciplinary Rounds Progress Note        Quality Flow Rounds held on April 1, 2020    Disciplines Attending:  Bedside Nurse, ,  and Nursing Unit Leadership    Nakia Jim was admitted on 3/19/2020  6:49 PM    Anticipated Discharge Date:  Expected Discharge Date: 04/06/20    Disposition:    Alek Score:  Alek Scale Score: 19    Readmission Risk              Risk of Unplanned Readmission:        9           Discussed patient goal for the day, patient clinical progression, and barriers to discharge. The following Goal(s) of the Day/Commitment(s) have been identified:  await COVID testing, wean oxygen.       Brian Ramon  April 1, 2020 [facility] Pregnancy/Less than or equal to 4 previous vaginal births/No known bleeding disorder/No history of postpartum hemorrhage/No other PPH risks indicated

## 2024-03-18 NOTE — OB PROVIDER H&P - ATTENDING COMMENTS
34 yo  at 39w GA admitted to L&D for scheduled rCS + BTL:  -upon arrival patinet feeling well, no compliants at this time   -Cat I FHT, vitals labs wnl.   -consents for rCS and BTL signed with  and Dr. Bell   -NPO until procedure  -patient has no further questions at this time 34 yo  at 39w GA admitted to L&D for scheduled rCS + BTL:  -upon arrival patinet feeling well, no compliants at this time   -Cat I FHT, vitals labs wnl.   -consents for rCS and BTL signed with  and Dr. Bell   -YOKOO until procedure  -patient has no further questions at this time      Attg Addendum:  I saw and evaluated patient with Dr Weems.    She is a 33 year old  at 39 wks GA by first trimester sono who presents to L&D for rCS + BS.   Will admit to L+D and preop for repeat c/s and BS for permanent sterilization  Informed consent obtained for both RC/S and BS.  All questions answered for pt.  She will accept blood products if needed.    SUBHASH Ott

## 2024-03-18 NOTE — OB PROVIDER DELIVERY SUMMARY - NSSELHIDDEN_OBGYN_ALL_OB_FT
[NS_DeliveryAssist1_OBGYN_ALL_OB_FT:[id number]],[NS_DeliveryRN_OBGYN_ALL_OB_FT:[id number]],[NS_DeliveryAttending2_OBGYN_ALL_OB_FT:[id number]],[NS_DeliveryAttending1_OBGYN_ALL_OB_FT:[id number]],[NS_DeliveryAssist2_OBGYN_ALL_OB_FT:[id number]]

## 2024-03-18 NOTE — DISCHARGE NOTE OB - CARE PLAN
Principal Discharge DX:	 delivery delivered  Assessment and plan of treatment:	Patient had  section. Hospital course was uncomplicated. Patient should plan to make an appointment with the office within 1 week for an incision check and 6 weeks for postpartum follow-up. Postpartum precautions were given at the time of discharge.   1

## 2024-03-18 NOTE — OB RN INTRAOPERATIVE NOTE - NS_ADDITIONALPERSONNEL_OBGYN_ALL_OB_FT
Last Mo, Paramedic Student; ELISABETH Rivera Dr. KARRIE Weems; Last Mo, Paramedic Student; ELISABETH Rivera

## 2024-03-18 NOTE — DISCHARGE NOTE OB - PHYSICIAN SECTION COMPLETE
Patients medication was sent to [place], they only live there in the winter.  It will take a few days before they will be sent to them here in [place].  Patient is wondering if you would have some samples for patient for a few days.  Vimpat 100mg, Breviate 50mg  is the medications  she is taking about.    [facility] in [place]   Yes

## 2024-03-18 NOTE — OB PROVIDER DELIVERY SUMMARY - NSPROVIDERDELIVERYNOTE_OBGYN_ALL_OB_FT
34 yo who presented for scheduled repeat C/S + BTL in the setting of prior c/s x2.   Patient was taken to the OR, a repeat low transverse  section + BTL was performed and a viable male infant was delivered atraumatically in vertex presentation at 1315. His name is Matt!  Loose nuchal cord x2, which was reduced. Delayed cord clamping was performed, then the cord was clamped and cut and the infant was handed off to the awaiting pediatrics team. Placenta delivered at 1316. Hysterotomy was reapproximated with suture, excellent hemostasis was noted. Bilateral salpingectomy was performed using the LigaSure. While reapproximating the fascia, bleeding was noted coming from below the fascial layer. The bleeding source was investigated, and was found to be coming from the underside of the right lateral rectus muscle and superior to the bladder flap layer. Electrocautery, Surgicell, and Surgisnow were utilized to help aid in hemostasis. Hemostasis was noted, and closure continued. Fascia and skin were reapproximated with suture, and excellent hemostasis was achieved at each layer of closure. Patient received TXA x2 intraoperatively for bleeding. The patient tolerated the procedure well and was taken back to recovery in stable condition.   Apgars: 9&9  QBL: 951  UOP: 100cc  IVF: 1000mL 34 yo , now , at 39w GA presented for scheduled repeat C/S + BTL in the setting of prior c/s x2.   Patient was taken to the OR, a repeat low transverse  section + BTL was performed and a viable male infant was delivered atraumatically in vertex presentation at 1315. His name is Matt!  Loose nuchal cord x2, which was reduced. Delayed cord clamping was performed, then the cord was clamped and cut and the infant was handed off to the awaiting pediatrics team. Placenta delivered at 1316. Hysterotomy was reapproximated with suture, excellent hemostasis was noted. Bilateral salpingectomy was performed using the LigaSure. While reapproximating the fascia, bleeding was noted coming from below the fascial layer. The bleeding source was investigated, and was found to be coming from the underside of the right lateral rectus muscle and superior to the bladder flap layer. Electrocautery, Surgicell, and Surgisnow were utilized to help aid in hemostasis. Hemostasis was noted, and closure continued. Fascia and skin were reapproximated with suture, and excellent hemostasis was achieved at each layer of closure. Patient received TXA x2 intraoperatively for bleeding. The patient tolerated the procedure well and was taken back to recovery in stable condition.   Apgars: 9&9  Weight: 3510g (7lb 10oz)  QBL: 951cc  UOP: 100cc  IVF: 1000mL 34 yo , now , at 39w GA presented for scheduled repeat C/S + BTL in the setting of prior c/s x2.   Patient was taken to the OR, a repeat low transverse  section + BTL was performed and a viable male infant was delivered atraumatically in vertex presentation at 1315. His name is Matt!  Loose nuchal cord x2, which was reduced. Delayed cord clamping was performed, then the cord was clamped and cut and the infant was handed off to the awaiting pediatrics team. Placenta delivered at 1316. Hysterotomy was reapproximated with suture, excellent hemostasis was noted. Bilateral salpingectomy was performed using the LigaSure. While reapproximating the fascia, bleeding was noted coming from below the fascial layer. The bleeding source was investigated, and was found to be coming from the underside of the right lateral rectus muscle and superior to the bladder flap layer. Electrocautery, Surgicell, and Surgisnow were utilized to help aid in hemostasis. Hemostasis was noted, and closure continued. Fascia and skin were reapproximated with suture, and excellent hemostasis was achieved at each layer of closure. Patient received TXA x2 intraoperatively for bleeding. The patient tolerated the procedure well and was taken back to recovery in stable condition.   Apgars: 9&9  Weight: 3510g (7lb 10oz)  QBL: 951cc  UOP: 100cc  IVF: 1000mL    Dictation #: [id number]

## 2024-03-19 LAB
BASOPHILS # BLD AUTO: 0.03 K/UL — SIGNIFICANT CHANGE UP (ref 0–0.2)
BASOPHILS NFR BLD AUTO: 0.2 % — SIGNIFICANT CHANGE UP (ref 0–2)
EOSINOPHIL # BLD AUTO: 0.02 K/UL — SIGNIFICANT CHANGE UP (ref 0–0.5)
EOSINOPHIL NFR BLD AUTO: 0.2 % — SIGNIFICANT CHANGE UP (ref 0–6)
HCT VFR BLD CALC: 36.2 % — SIGNIFICANT CHANGE UP (ref 34.5–45)
HGB BLD-MCNC: 11.8 G/DL — SIGNIFICANT CHANGE UP (ref 11.5–15.5)
IMM GRANULOCYTES NFR BLD AUTO: 0.6 % — SIGNIFICANT CHANGE UP (ref 0–0.9)
LYMPHOCYTES # BLD AUTO: 16.6 % — SIGNIFICANT CHANGE UP (ref 13–44)
LYMPHOCYTES # BLD AUTO: 2.05 K/UL — SIGNIFICANT CHANGE UP (ref 1–3.3)
MCHC RBC-ENTMCNC: 26.8 PG — LOW (ref 27–34)
MCHC RBC-ENTMCNC: 32.6 GM/DL — SIGNIFICANT CHANGE UP (ref 32–36)
MCV RBC AUTO: 82.3 FL — SIGNIFICANT CHANGE UP (ref 80–100)
MONOCYTES # BLD AUTO: 1.11 K/UL — HIGH (ref 0–0.9)
MONOCYTES NFR BLD AUTO: 9 % — SIGNIFICANT CHANGE UP (ref 2–14)
NEUTROPHILS # BLD AUTO: 9.09 K/UL — HIGH (ref 1.8–7.4)
NEUTROPHILS NFR BLD AUTO: 73.4 % — SIGNIFICANT CHANGE UP (ref 43–77)
PLATELET # BLD AUTO: 203 K/UL — SIGNIFICANT CHANGE UP (ref 150–400)
RBC # BLD: 4.4 M/UL — SIGNIFICANT CHANGE UP (ref 3.8–5.2)
RBC # FLD: 14.4 % — SIGNIFICANT CHANGE UP (ref 10.3–14.5)
T PALLIDUM AB TITR SER: NEGATIVE — SIGNIFICANT CHANGE UP
WBC # BLD: 12.38 K/UL — HIGH (ref 3.8–10.5)
WBC # FLD AUTO: 12.38 K/UL — HIGH (ref 3.8–10.5)

## 2024-03-19 RX ORDER — IBUPROFEN 200 MG
600 TABLET ORAL EVERY 6 HOURS
Refills: 0 | Status: DISCONTINUED | OUTPATIENT
Start: 2024-03-19 | End: 2024-03-20

## 2024-03-19 RX ORDER — IBUPROFEN 200 MG
1 TABLET ORAL
Qty: 0 | Refills: 0 | DISCHARGE
Start: 2024-03-19

## 2024-03-19 RX ORDER — ACETAMINOPHEN 500 MG
3 TABLET ORAL
Qty: 0 | Refills: 0 | DISCHARGE
Start: 2024-03-19

## 2024-03-19 RX ADMIN — Medication 975 MILLIGRAM(S): at 14:14

## 2024-03-19 RX ADMIN — Medication 30 MILLIGRAM(S): at 06:36

## 2024-03-19 RX ADMIN — Medication 975 MILLIGRAM(S): at 08:52

## 2024-03-19 RX ADMIN — Medication 30 MILLIGRAM(S): at 12:58

## 2024-03-19 RX ADMIN — Medication 30 MILLIGRAM(S): at 00:13

## 2024-03-19 RX ADMIN — Medication 975 MILLIGRAM(S): at 20:27

## 2024-03-19 RX ADMIN — Medication 975 MILLIGRAM(S): at 02:35

## 2024-03-19 RX ADMIN — Medication 600 MILLIGRAM(S): at 18:50

## 2024-03-19 RX ADMIN — ENOXAPARIN SODIUM 40 MILLIGRAM(S): 100 INJECTION SUBCUTANEOUS at 02:35

## 2024-03-19 NOTE — PROGRESS NOTE ADULT - ASSESSMENT
A/P:  JINNY VICK is a 33y  now POD#1 s/p repeat  section + BTL at 39w gestation, s/p TXA x2 for intra op bleeding.  -Vital signs stable  -Hgb: 12.7 > 11.9  -Voiding, tolerating PO  -Advance care as tolerated   -Continue routine postpartum and postoperative care and education  -Healthy male infant, desires circumcision  -DVT ppx: SCDs in place, lovenox  -Dispo: Anticipate discharge to home 3/20 pending attending approval.

## 2024-03-20 VITALS
SYSTOLIC BLOOD PRESSURE: 104 MMHG | DIASTOLIC BLOOD PRESSURE: 66 MMHG | HEART RATE: 69 BPM | TEMPERATURE: 97 F | RESPIRATION RATE: 18 BRPM | OXYGEN SATURATION: 100 %

## 2024-03-20 PROCEDURE — 86901 BLOOD TYPING SEROLOGIC RH(D): CPT

## 2024-03-20 PROCEDURE — 86780 TREPONEMA PALLIDUM: CPT

## 2024-03-20 PROCEDURE — 59050 FETAL MONITOR W/REPORT: CPT

## 2024-03-20 PROCEDURE — C1889: CPT

## 2024-03-20 PROCEDURE — 86900 BLOOD TYPING SEROLOGIC ABO: CPT

## 2024-03-20 PROCEDURE — 88302 TISSUE EXAM BY PATHOLOGIST: CPT

## 2024-03-20 PROCEDURE — 36415 COLL VENOUS BLD VENIPUNCTURE: CPT

## 2024-03-20 PROCEDURE — 86850 RBC ANTIBODY SCREEN: CPT

## 2024-03-20 PROCEDURE — 85025 COMPLETE CBC W/AUTO DIFF WBC: CPT

## 2024-03-20 PROCEDURE — 85027 COMPLETE CBC AUTOMATED: CPT

## 2024-03-20 PROCEDURE — 59025 FETAL NON-STRESS TEST: CPT

## 2024-03-20 RX ADMIN — Medication 600 MILLIGRAM(S): at 05:43

## 2024-03-20 RX ADMIN — ENOXAPARIN SODIUM 40 MILLIGRAM(S): 100 INJECTION SUBCUTANEOUS at 02:56

## 2024-03-20 RX ADMIN — Medication 975 MILLIGRAM(S): at 02:57

## 2024-03-20 RX ADMIN — Medication 975 MILLIGRAM(S): at 09:54

## 2024-03-20 RX ADMIN — Medication 600 MILLIGRAM(S): at 00:13

## 2024-03-20 NOTE — PROGRESS NOTE ADULT - SUBJECTIVE AND OBJECTIVE BOX
33y Female s/p c section, B/L Salpinjectomy under spinal anesthesia with duramorph for post op analgesia on 03/18/2024    Vital Signs    T(C): 36.8 (19 Mar 2024 04:56), Max: 36.8 (18 Mar 2024 16:55)  T(F): 98.3 (19 Mar 2024 04:56), Max: 98.3 (19 Mar 2024 00:03)  HR: 65 (19 Mar 2024 04:56) (65 - 102)  BP: 85/60 (19 Mar 2024 04:56) (85/60 - 112/67)  BP(mean): --  RR: 18 (19 Mar 2024 04:56) (16 - 18)  SpO2: 98% (19 Mar 2024 04:56) (93% - 100%)            Patient's overall anesthesia satisfaction: Positive    Patients pain is well controlled    No pruritis at this time    Patient seen and doing well     No headache      No residual numbness or weakness, sensory and motor function intact    Site examined and ......    No anesthetic complications or complaints noted or reported          .            
JINNY VICK is a 33y  now POD#1 s/p repeat  section + BTL at 39w gestation, s/p TXA x2 for intra op bleeding.    S:    No acute events overnight.   The patient has no complaints.  Pain controlled with current treatment regimen.   She is ambulating without difficulty and tolerating PO.   + flatus/-BM/+ voiding   She endorses appropriate lochia, which is decreasing.   She is breastfeeding without difficulty.   She denies fevers, chills, nausea and vomiting.   She denies lightheadedness, dizziness, palpitations, chest pain and SOB.     O:    T(C): 36.8 (24 @ 04:56), Max: 36.8 (24 @ 16:55)  HR: 65 (24 @ 04:56) (65 - 102)  BP: 85/60 (24 @ 04:56) (85/60 - 112/67)  RR: 18 (24 @ 04:56) (16 - 18)  SpO2: 98% (24 @ 04:56) (93% - 100%)    Gen: NAD, AOx3, resting comfortably on room air   Abdomen:  Soft, non-tender, non-distended  Incision: Clean/dry/intact with steris in place   Uterus:  Fundus firm below umbilicus  VE:  Expected lochia  Ext:  b/l LE non-tender                           11.8   12.38 )-----------( 203      ( 19 Mar 2024 05:16 )             36.2             
Subjective:  The patient feels well.  She is tolerating regular diet.  She denies nausea and vomiting.  Her pain is controlled.  She reports normal postpartum bleeding.      Physical exam:    Vital Signs Last 24 Hrs  T(C): 36.8 (19 Mar 2024 04:56), Max: 36.8 (18 Mar 2024 16:55)  T(F): 98.3 (19 Mar 2024 04:56), Max: 98.3 (19 Mar 2024 00:03)  HR: 65 (19 Mar 2024 04:56) (65 - 102)  BP: 85/60 (19 Mar 2024 04:56) (85/60 - 112/67)  BP(mean): --  RR: 18 (19 Mar 2024 04:56) (16 - 18)  SpO2: 98% (19 Mar 2024 04:56) (93% - 100%)        Gen: NAD  Breast: Soft, nontender, not engorged.  Abdomen: Soft, nontender, no distension , firm uterine fundus at umbilicus.  Incision: Clean, dry, and intact with steri strips  Pelvic: Normal lochia noted  Ext: No calf tenderness    SCD's on bilateral lower extremities    LABS:                        11.8   12.38 )-----------( 203      ( 19 Mar 2024 05:16 )             36.2     Antibody Screen: NEG (03-18 @ 11:36)      POD # 1  Doing well.  Routine PO care.        .        
JINNY VICK is a 33y  now POD#2 s/p repeat  section + BTL at 39w gestation, s/p TXA x2 for intra op bleeding.    S:    No acute events overnight.   The patient has no complaints.  Pain controlled with current treatment regimen.   She is ambulating without difficulty and tolerating PO.   + flatus/-BM/+ voiding   She endorses appropriate lochia, which is decreasing.   She is breastfeeding without difficulty.   She denies fevers, chills, nausea and vomiting.   She denies lightheadedness, dizziness, palpitations, chest pain and SOB.     O:    Vital Signs Last 24 Hrs  T(C): 36.6 (20 Mar 2024 04:44), Max: 37 (20 Mar 2024 00:33)  T(F): 97.8 (20 Mar 2024 04:44), Max: 98.6 (20 Mar 2024 00:33)  HR: 76 (20 Mar 2024 04:44) (71 - 87)  BP: 95/68 (20 Mar 2024 04:44) (95/68 - 111/71)  RR: 18 (20 Mar 2024 04:44) (18 - 18)  SpO2: 100% (20 Mar 2024 04:44) (97% - 100%)    Gen: NAD, AOx3, resting comfortably on room air   Abdomen:  Soft, non-tender, non-distended  Incision: Clean/dry/intact with steris in place   Uterus:  Fundus firm below umbilicus  VE:  Expected lochia  Ext:  b/l LE non-tender                           11.8   12.38 )-----------( 203      ( 19 Mar 2024 05:16 )             36.2

## 2024-03-20 NOTE — PROGRESS NOTE ADULT - ASSESSMENT
A/P:  JINNY VICK is a 33y  now POD#2 s/p repeat  section + BTL at 39w gestation, s/p TXA x2 for intra op bleeding.  -Vital signs stable  -Hgb: 12.7 > 11.8  -Voiding, tolerating PO  -Advance care as tolerated   -Continue routine postpartum and postoperative care and education  -Healthy male infant, desires circumcision  -DVT ppx: SCDs in place, lovenox  -Dispo: Anticipate discharge to home today pending attending approval.

## 2024-03-20 NOTE — PROGRESS NOTE ADULT - ATTENDING COMMENTS
post /BS day # 2  no complaints  exam wnl  labs reviewed    cleared for dc  discussed vaccination, breastfeeding  follow up for post partum visit

## 2025-08-04 ENCOUNTER — EMERGENCY (EMERGENCY)
Facility: HOSPITAL | Age: 35
LOS: 1 days | End: 2025-08-04
Attending: EMERGENCY MEDICINE
Payer: MEDICAID

## 2025-08-04 VITALS
OXYGEN SATURATION: 100 % | DIASTOLIC BLOOD PRESSURE: 81 MMHG | WEIGHT: 164.91 LBS | HEART RATE: 81 BPM | TEMPERATURE: 99 F | SYSTOLIC BLOOD PRESSURE: 121 MMHG | RESPIRATION RATE: 16 BRPM | HEIGHT: 62 IN

## 2025-08-04 DIAGNOSIS — Z98.891 HISTORY OF UTERINE SCAR FROM PREVIOUS SURGERY: Chronic | ICD-10-CM

## 2025-08-04 PROBLEM — Z78.9 OTHER SPECIFIED HEALTH STATUS: Chronic | Status: ACTIVE | Noted: 2024-03-18

## 2025-08-04 PROCEDURE — 73564 X-RAY EXAM KNEE 4 OR MORE: CPT | Mod: 26,LT

## 2025-08-04 PROCEDURE — 73700 CT LOWER EXTREMITY W/O DYE: CPT

## 2025-08-04 PROCEDURE — 96372 THER/PROPH/DIAG INJ SC/IM: CPT

## 2025-08-04 PROCEDURE — 73564 X-RAY EXAM KNEE 4 OR MORE: CPT

## 2025-08-04 PROCEDURE — 99284 EMERGENCY DEPT VISIT MOD MDM: CPT

## 2025-08-04 PROCEDURE — 99284 EMERGENCY DEPT VISIT MOD MDM: CPT | Mod: 25

## 2025-08-04 PROCEDURE — 73700 CT LOWER EXTREMITY W/O DYE: CPT | Mod: 26,LT

## 2025-08-04 RX ORDER — OXYCODONE HYDROCHLORIDE AND ACETAMINOPHEN 10; 325 MG/1; MG/1
1 TABLET ORAL ONCE
Refills: 0 | Status: DISCONTINUED | OUTPATIENT
Start: 2025-08-04 | End: 2025-08-04

## 2025-08-04 RX ORDER — KETOROLAC TROMETHAMINE 30 MG/ML
30 INJECTION, SOLUTION INTRAMUSCULAR; INTRAVENOUS ONCE
Refills: 0 | Status: DISCONTINUED | OUTPATIENT
Start: 2025-08-04 | End: 2025-08-04

## 2025-08-04 RX ORDER — IBUPROFEN 200 MG
1 TABLET ORAL
Qty: 28 | Refills: 0
Start: 2025-08-04 | End: 2025-08-10

## 2025-08-04 RX ADMIN — OXYCODONE HYDROCHLORIDE AND ACETAMINOPHEN 1 TABLET(S): 10; 325 TABLET ORAL at 12:15

## 2025-08-04 RX ADMIN — OXYCODONE HYDROCHLORIDE AND ACETAMINOPHEN 1 TABLET(S): 10; 325 TABLET ORAL at 11:24

## 2025-08-04 RX ADMIN — KETOROLAC TROMETHAMINE 30 MILLIGRAM(S): 30 INJECTION, SOLUTION INTRAMUSCULAR; INTRAVENOUS at 11:24

## 2025-08-04 RX ADMIN — KETOROLAC TROMETHAMINE 30 MILLIGRAM(S): 30 INJECTION, SOLUTION INTRAMUSCULAR; INTRAVENOUS at 12:15
